# Patient Record
Sex: FEMALE | Race: WHITE | NOT HISPANIC OR LATINO | URBAN - METROPOLITAN AREA
[De-identification: names, ages, dates, MRNs, and addresses within clinical notes are randomized per-mention and may not be internally consistent; named-entity substitution may affect disease eponyms.]

---

## 2022-05-20 ENCOUNTER — EMERGENCY (EMERGENCY)
Facility: HOSPITAL | Age: 27
LOS: 1 days | Discharge: HOME | End: 2022-05-20
Attending: EMERGENCY MEDICINE | Admitting: EMERGENCY MEDICINE
Payer: COMMERCIAL

## 2022-05-20 VITALS
OXYGEN SATURATION: 98 % | WEIGHT: 139.99 LBS | TEMPERATURE: 98 F | SYSTOLIC BLOOD PRESSURE: 133 MMHG | HEIGHT: 63 IN | RESPIRATION RATE: 18 BRPM | HEART RATE: 77 BPM | DIASTOLIC BLOOD PRESSURE: 87 MMHG

## 2022-05-20 PROCEDURE — 99285 EMERGENCY DEPT VISIT HI MDM: CPT

## 2022-05-20 NOTE — ED ADULT NURSE NOTE - CHIEF COMPLAINT QUOTE
Pt presents to ED with complaints of syncope. As per pt, she was having a painful massage when she started to feel dizzy and passed out. Pt states after passing out she hit her head. Pt states she has had a syncopal episode in the past when she had an IUD placed. Pt denies use of blood thinners or cardiac history. Pt went to [facility] and was referred to the ED for CT of head.

## 2022-05-20 NOTE — ED ADULT NURSE NOTE - NSIMPLEMENTINTERV_GEN_ALL_ED
Implemented All Universal Safety Interventions:  [place] to call system. Call bell, personal items and telephone within reach. Instruct patient to call for assistance. Room bathroom lighting operational. Non-slip footwear when patient is off stretcher. Physically safe environment: no spills, clutter or unnecessary equipment. Stretcher in lowest position, wheels locked, appropriate side rails in place.

## 2022-05-20 NOTE — ED ADULT NURSE NOTE - BOWEL SOUNDS LUQ
Telephone Encounter by Smiley Aragon RN at 04/10/17 10:34 AM     Author:  Smiley Aragon RN Service:  (none) Author Type:  Registered Nurse     Filed:  04/10/17 10:34 AM Encounter Date:  4/10/2017 Status:  Signed     :  Smiley Aragon RN (Registered Nurse)       From: Courtney Fried  To: Ingrid Cruz DO  Sent: 4/10/2017  9:53 AM CDT  Subject: Acute Illness  Advice    Good Morning,    I have another UTI, which I tend to get quite frequently after I see my   long distance boyfriend.  The last few times I have just gone to [facility] so I do not have to miss work.  But both of the doctors I saw told me   I should speak to you about getting a prescription with refills so I can   just have it when these turn up. Right now it is just the usual symptoms,   pain when urinating, cloudy and bad smelling urine.  My boyfriend is in   the process of moving up here now and I am afraid when that happens these   are going to be even more frequent.  I really appreciate your help with   this.    Thanks much!       Revision History        Date/Time User Provider Type Action    > 04/10/17 10:34 AM Smiley Aragon RN Registered Nurse Sign    Attribution information within the note text is not available.            
Telephone Encounter by Smiley Aragon RN at 04/10/17 10:39 AM     Author:  Smiley Aragon RN Service:  (none) Author Type:  Registered Nurse     Filed:  04/10/17 10:40 AM Encounter Date:  4/10/2017 Status:  Signed     :  Smiley Aragon RN (Registered Nurse)            Patient notified[KH1.1T] to set up an appointment with Dr Cruz.[KH1.1M]      Revision History        User Key Date/Time User Provider Type Action    > KH1.1 04/10/17 10:40 AM Smiley Aragon, RN Registered Nurse Sign    M - Manual, T - Template            
present

## 2022-05-20 NOTE — ED ADULT NURSE NOTE - OBJECTIVE STATEMENT
Pt is 26 y.o female client came in with complaint of syncope. As per pt, she was having a painful chair massage when she started to feel dizzy and passed out hit the floor facing forward. Pt states she remembered everything that happened. Pt states she has had a syncopal episode in the past when she had an IUD placed. Pt denies use of blood thinners or cardiac history. Pt went to [facility] and was referred to the ED for CT of head. Pt states she is insulin resistant. No laceration or bleeding noted. Pt denies headache, n&v, dizziness, lightheadedness, blurred vison, chest pain, sob at present. Pt denies any allergies to meds or any medical hx or condition.

## 2022-05-21 VITALS
DIASTOLIC BLOOD PRESSURE: 76 MMHG | RESPIRATION RATE: 18 BRPM | HEART RATE: 78 BPM | OXYGEN SATURATION: 98 % | SYSTOLIC BLOOD PRESSURE: 112 MMHG | TEMPERATURE: 98 F

## 2022-05-21 LAB
ALBUMIN SERPL ELPH-MCNC: 4.6 G/DL — SIGNIFICANT CHANGE UP (ref 3.3–5)
ALP SERPL-CCNC: 66 U/L — SIGNIFICANT CHANGE UP (ref 40–120)
ALT FLD-CCNC: 12 U/L — SIGNIFICANT CHANGE UP (ref 10–45)
ANION GAP SERPL CALC-SCNC: 10 MMOL/L — SIGNIFICANT CHANGE UP (ref 5–17)
AST SERPL-CCNC: 18 U/L — SIGNIFICANT CHANGE UP (ref 10–40)
BASOPHILS # BLD AUTO: 0.05 K/UL — SIGNIFICANT CHANGE UP (ref 0–0.2)
BASOPHILS NFR BLD AUTO: 0.4 % — SIGNIFICANT CHANGE UP (ref 0–2)
BILIRUB SERPL-MCNC: 0.4 MG/DL — SIGNIFICANT CHANGE UP (ref 0.2–1.2)
BUN SERPL-MCNC: 16 MG/DL — SIGNIFICANT CHANGE UP (ref 7–23)
CALCIUM SERPL-MCNC: 9.7 MG/DL — SIGNIFICANT CHANGE UP (ref 8.4–10.5)
CHLORIDE SERPL-SCNC: 102 MMOL/L — SIGNIFICANT CHANGE UP (ref 96–108)
CO2 SERPL-SCNC: 26 MMOL/L — SIGNIFICANT CHANGE UP (ref 22–31)
CREAT SERPL-MCNC: 0.62 MG/DL — SIGNIFICANT CHANGE UP (ref 0.5–1.3)
EGFR: 125 ML/MIN/1.73M2 — SIGNIFICANT CHANGE UP
EOSINOPHIL # BLD AUTO: 0.15 K/UL — SIGNIFICANT CHANGE UP (ref 0–0.5)
EOSINOPHIL NFR BLD AUTO: 1.2 % — SIGNIFICANT CHANGE UP (ref 0–6)
GLUCOSE SERPL-MCNC: 97 MG/DL — SIGNIFICANT CHANGE UP (ref 70–99)
HCT VFR BLD CALC: 40.5 % — SIGNIFICANT CHANGE UP (ref 34.5–45)
HGB BLD-MCNC: 13.8 G/DL — SIGNIFICANT CHANGE UP (ref 11.5–15.5)
IMM GRANULOCYTES NFR BLD AUTO: 0.3 % — SIGNIFICANT CHANGE UP (ref 0–1.5)
LYMPHOCYTES # BLD AUTO: 2.78 K/UL — SIGNIFICANT CHANGE UP (ref 1–3.3)
LYMPHOCYTES # BLD AUTO: 22.2 % — SIGNIFICANT CHANGE UP (ref 13–44)
MCHC RBC-ENTMCNC: 30.3 PG — SIGNIFICANT CHANGE UP (ref 27–34)
MCHC RBC-ENTMCNC: 34.1 GM/DL — SIGNIFICANT CHANGE UP (ref 32–36)
MCV RBC AUTO: 88.8 FL — SIGNIFICANT CHANGE UP (ref 80–100)
MONOCYTES # BLD AUTO: 0.64 K/UL — SIGNIFICANT CHANGE UP (ref 0–0.9)
MONOCYTES NFR BLD AUTO: 5.1 % — SIGNIFICANT CHANGE UP (ref 2–14)
NEUTROPHILS # BLD AUTO: 8.88 K/UL — HIGH (ref 1.8–7.4)
NEUTROPHILS NFR BLD AUTO: 70.8 % — SIGNIFICANT CHANGE UP (ref 43–77)
NRBC # BLD: 0 /100 WBCS — SIGNIFICANT CHANGE UP (ref 0–0)
PLATELET # BLD AUTO: 236 K/UL — SIGNIFICANT CHANGE UP (ref 150–400)
POTASSIUM SERPL-MCNC: 3.9 MMOL/L — SIGNIFICANT CHANGE UP (ref 3.5–5.3)
POTASSIUM SERPL-SCNC: 3.9 MMOL/L — SIGNIFICANT CHANGE UP (ref 3.5–5.3)
PROT SERPL-MCNC: 7.4 G/DL — SIGNIFICANT CHANGE UP (ref 6–8.3)
RBC # BLD: 4.56 M/UL — SIGNIFICANT CHANGE UP (ref 3.8–5.2)
RBC # FLD: 13.2 % — SIGNIFICANT CHANGE UP (ref 10.3–14.5)
SODIUM SERPL-SCNC: 138 MMOL/L — SIGNIFICANT CHANGE UP (ref 135–145)
WBC # BLD: 12.54 K/UL — HIGH (ref 3.8–10.5)
WBC # FLD AUTO: 12.54 K/UL — HIGH (ref 3.8–10.5)

## 2022-05-21 PROCEDURE — 85025 COMPLETE CBC W/AUTO DIFF WBC: CPT

## 2022-05-21 PROCEDURE — 99284 EMERGENCY DEPT VISIT MOD MDM: CPT | Mod: 25

## 2022-05-21 PROCEDURE — 70498 CT ANGIOGRAPHY NECK: CPT | Mod: MG

## 2022-05-21 PROCEDURE — G1004: CPT

## 2022-05-21 PROCEDURE — 70498 CT ANGIOGRAPHY NECK: CPT | Mod: 26,MG

## 2022-05-21 PROCEDURE — 70496 CT ANGIOGRAPHY HEAD: CPT | Mod: MG

## 2022-05-21 PROCEDURE — 70496 CT ANGIOGRAPHY HEAD: CPT | Mod: 26,MG

## 2022-05-21 PROCEDURE — 36415 COLL VENOUS BLD VENIPUNCTURE: CPT

## 2022-05-21 PROCEDURE — 80053 COMPREHEN METABOLIC PANEL: CPT

## 2022-05-21 RX ORDER — ACETAMINOPHEN 500 MG
650 TABLET ORAL ONCE
Refills: 0 | Status: COMPLETED | OUTPATIENT
Start: 2022-05-21 | End: 2022-05-21

## 2022-05-21 RX ADMIN — Medication 650 MILLIGRAM(S): at 02:49

## 2022-05-21 RX ADMIN — Medication 650 MILLIGRAM(S): at 02:41

## 2022-05-21 NOTE — ED PROVIDER NOTE - CLINICAL SUMMARY MEDICAL DECISION MAKING FREE TEXT BOX
27 yo female, generally healthy, in the Er after syncope episode. Pt reports she had a chair massage that was very painful in her posterior neck area and she asked to stop it. Pt reports she felt dizzy and fainted. Pt fell face forward and hit her forehead. For a brief moment after she was dizzy. Also had small bleeding from her left nostril. Pt denies any dizziness, vision changes, unsteady gait, CP, back pain or abdominal pain.   Pt still feels sore and painful to her posterior neck, R>L, and mild HA.  Pt well appearing, VSS, has no focal neuro deficits and unremarkable exam, including Neuro: Alert and oriented x 3, face symmetric and speech fluent. Strength 5/5 x 4 ext and symmetric, nml gross motor movement, nml gait. pending head and neck CTA. Tylenol given for HA. anticipate d/c home if no acute findings.

## 2022-05-21 NOTE — ED PROVIDER NOTE - CARE PROVIDERS DIRECT ADDRESSES
[email or web address]

## 2022-05-21 NOTE — ED PROVIDER NOTE - OBJECTIVE STATEMENT
27 yo female, generally healthy, in the Er after syncope episode. Pt reports she had a chair massage. 27 yo female, generally healthy, in the Er after syncope episode. Pt reports she had a chair massage that was very painful in her posterior neck area and she asked to stop it. Pt reports she felt dizzy and fainted. Pt fell face forward and hit her forehead. For a brief moment after she was dizzy. Also had small bleeding from her left nostril. Pt denies any dizziness, vision changes, unsteady gait, CP, back pain or abdominal pain.   Pt still feels sore and painful to her posterior neck, R>L, and mild HA.

## 2022-05-21 NOTE — ED PROVIDER NOTE - PATIENT PORTAL LINK FT
You can access the FollowMyHealth Patient Portal offered by [facility] by registering at the following website: [email or web address]. By joining [facility]’s FollowMyHealth portal, you will also be able to view your health information using other applications (apps) compatible with our system.

## 2022-05-21 NOTE — ED PROVIDER NOTE - MUSCULOSKELETAL, MLM
Spine and all extremities grossly  appears normal, range of motion is not limited, no muscle or joint tenderness

## 2022-05-21 NOTE — ED PROVIDER NOTE - CARE PROVIDER_API CALL
Patricia Harkins (MD)  Surgery; Vascular Surgery  [address], [facility] [address]  [place] [address]  Phone: [phone]  Fax: [phone]  Follow Up Time:     Tamie Rockwell)  Surgery; Vascular Surgery  [address], [address], [place] [address]  Phone: [phone]  Fax: [phone]  Follow Up Time:

## 2022-05-21 NOTE — ED PROVIDER NOTE - NS ED ATTENDING STATEMENT MOD
This was a shared visit with the SIMA. I reviewed and verified the documentation and independently performed the documented:

## 2022-05-21 NOTE — ED PROVIDER NOTE - ATTENDING APP SHARED VISIT CONTRIBUTION OF CARE
syncope after neck massage that was painful. history suggestive of vagal episode, no cardiac symptoms, however notes persistent neck pain so cta done to r/o arterial injury. no dissection however possible vascular malformation on resident prelim read. vascular consulted, rec outpatient f/u. ecg nsr, neuro non focal, well appearing

## 2022-05-24 DIAGNOSIS — R51.9 HEADACHE, UNSPECIFIED: ICD-10-CM

## 2022-05-24 DIAGNOSIS — M54.2 CERVICALGIA: ICD-10-CM
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
## 2022-05-24 DIAGNOSIS — R04.0 EPISTAXIS: ICD-10-CM

## 2022-05-24 DIAGNOSIS — R55 SYNCOPE AND COLLAPSE: ICD-10-CM
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
## 2022-05-24 DIAGNOSIS — R42 DIZZINESS AND GIDDINESS: ICD-10-CM

## 2022-06-06 PROBLEM — Z00.00 ENCOUNTER FOR PREVENTIVE HEALTH EXAMINATION: Status: ACTIVE | Noted: 2022-06-06

## 2022-06-14 ENCOUNTER — APPOINTMENT (OUTPATIENT)
Dept: VASCULAR SURGERY | Facility: CLINIC | Age: 27
End: 2022-06-14
Payer: COMMERCIAL

## 2022-06-14 VITALS
SYSTOLIC BLOOD PRESSURE: 137 MMHG | WEIGHT: 140 LBS | HEART RATE: 89 BPM | HEIGHT: 63 IN | DIASTOLIC BLOOD PRESSURE: 78 MMHG | BODY MASS INDEX: 24.8 KG/M2

## 2022-06-14 DIAGNOSIS — Z78.9 OTHER SPECIFIED HEALTH STATUS: ICD-10-CM

## 2022-06-14 DIAGNOSIS — R55 SYNCOPE AND COLLAPSE: ICD-10-CM

## 2022-06-14 PROCEDURE — 93880 EXTRACRANIAL BILAT STUDY: CPT

## 2022-06-14 PROCEDURE — 99204 OFFICE O/P NEW MOD 45 MIN: CPT

## 2022-06-15 PROBLEM — Z78.9 SOCIAL ALCOHOL USE: Status: ACTIVE | Noted: 2022-06-14

## 2022-06-15 PROBLEM — R55 ATYPICAL SYNCOPE: Status: ACTIVE | Noted: 2022-06-15

## 2022-06-15 RX ORDER — CYCLOSPORINE 100 MG/1
100 CAPSULE, LIQUID FILLED ORAL
Qty: 60 | Refills: 0 | Status: ACTIVE | COMMUNITY
Start: 2022-03-25

## 2022-06-15 RX ORDER — FLUOCINOLONE ACETONIDE 0.25 MG/G
0.03 OINTMENT TOPICAL
Qty: 60 | Refills: 0 | Status: ACTIVE | COMMUNITY
Start: 2021-10-20

## 2022-06-20 NOTE — ASSESSMENT
[FreeTextEntry1] : 26yoF w/chronically elevated CRP x 2y of unknown etiology, seen in the ED 2wks prior after an episode of syncope during a neck massage, noted to have tortuous venous structures on CTA neck.  Pt  states that during her chair neck massage, she became nauseous and dizzy as a result of the pain, and shortly after syncopized; she syncopized one other time when she underwent IUD placement and experienced sharp, severe pain.  Family hx significant for Hashimoto's thyroiditis (mom/sister).\par \par CTA neck reviewed, which revealed no evidence of carotid/vertebral stenosis/dissection, but incidentally noted a tortuous pterygoid plexus.  On exam, no focal neurologic deficits were noted, and carotid duplex performed to evaluate for extracranial disease reveals no evidence of stenosis/tortuosity/dissection/arteritis.  Reassured pt that the CTA neck findings were not of any clinical significance; instructed her to f/u w/her PCP if syncope recurs, and explained that she may benefit from neuro evaluation.

## 2022-06-20 NOTE — DATA REVIEWED
[FreeTextEntry1] : CTA neck reviewed, which revealed no evidence of carotid/vertebral stenosis/dissection, but incidentally noted a tortuous pterygoid plexus

## 2022-06-20 NOTE — PROCEDURE
[FreeTextEntry1] : Carotid duplex performed to evaluate for extracranial disease reveals no evidence of stenosis/tortuosity/dissection/arteritis

## 2022-06-20 NOTE — HISTORY OF PRESENT ILLNESS
[FreeTextEntry1] : 26yoF w/chronically elevated CRP x 2y of unknown etiology, seen in the ED 2wks prior after an episode of syncope during a neck massage, noted to have tortuous venous structures on CTA neck.  Pt  states that during her chair neck massage, she became nauseous and dizzy as a result of the pain, and shortly after syncopized; she syncopized one other time when she underwent IUD placement and experienced sharp, severe pain.  Family hx significant for Hashimoto's thyroiditis (mom/sister).

## 2022-06-20 NOTE — ADDENDUM
[FreeTextEntry1] : This note was written by Tanmay Barreto, acting as a scribe for Dr. Tamie Rockwell.  I, Dr. Tamie Rockwell, have read and attest that all the information, medical decision-making, and discharge instructions within are true and accurate.\par \par I, Dr. Tamie Rockwell, personally performed the evaluation and management (E/M) services for this new patient.  That E/M includes conducting the initial examination, assessing all conditions, and establishing the plan of care.  Today, my ACP, Tanmay Barreto, was here to observe my evaluation and management services for this patient to be followed going forward.

## 2022-06-20 NOTE — PHYSICAL EXAM
[Normal Thyroid] : the thyroid was normal [Normal Breath Sounds] : Normal breath sounds [Respiratory Effort] : normal respiratory effort [Normal Heart Sounds] : normal heart sounds [Normal Rate and Rhythm] : normal rate and rhythm [2+] : left 2+ [No Rash or Lesion] : No rash or lesion [Alert] : alert [Calm] : calm [JVD] : no jugular venous distention  [Carotid Bruits] : no carotid bruits [Right Carotid Bruit] : no bruit heard over the right carotid [Left Carotid Bruit] : no bruit heard over the left carotid [Purpura] : no purpura  [Petechiae] : no petechiae [Skin Ulcer] : no ulcer [Skin Induration] : no induration [de-identified] : Well-nourished, NAD [de-identified] : NC/AT, marryictagustina, EOMIx6 [de-identified] : FROM throughout, strength 5/5x4, NEG Romberg/Pronator drift [de-identified] : CNII-XII/WILD grossly intact

## 2022-06-20 NOTE — REASON FOR VISIT
[Consultation] : a consultation visit [FreeTextEntry1] : Numerous venous structures incidentally noted on CT neck

## 2024-01-09 NOTE — ED ADULT NURSE NOTE - PRIMARY CARE PROVIDER
Please notify patient: TSH is  high, free T4 is normal    However TSH came down a little   Both numbers are improved    Did she miss any dosages of Synthroid?  Currently she is to take only 88 mcg daily on an empty stomach with no other medications or food----is she doing that?    Will recheck thyroid function at the next appointment  Future Appointments  1/15/2024  3:00 PM    [facility] CT RM 2 FAST SCANNER   [facility] CT SCAN        [facility] Radiolog  3/5/2024   3:00 PM    Kathy Cervantes MD    [facility]  3/15/2024  10:15 AM   SCHEDULE, SHELIA TCC THOMPSON * SHELIA TCC TOLE        SHELIA THOMPSON C   unknown

## 2025-03-17 NOTE — ED ADULT TRIAGE NOTE - WEIGHT IN LBS
Call Reason:  Surgery scheduling   Surgeon:  Dr. Eze Bautista     Notes: After discussing possible dates, patient has been scheduled for surgery on September 29th, 2025. Surgery scheduler explained that all necessary pre-op appointments will be set up on behalf of the patient, and they will receive a call back to confirm all dates and times. Writer also noted surgery itinerary will be sent out VIA [facility] and Home Address.     Patient is welcomed to call the office if they have any questions or concerns prior to surgery at [phone]. Patient expressed understanding, and thanked writer for their help.     139.9